# Patient Record
(demographics unavailable — no encounter records)

---

## 2018-01-23 NOTE — ED PDOC
HPI: Abdomen


Time Seen by Provider: 18 12:30


Chief Complaint (Nursing): Female Genitourinary


Chief Complaint (Provider): abdominal pain


History Per: Patient


History/Exam Limitations: no limitations


Onset/Duration Of Symptoms: Days (x1 week)


Current Symptoms Are (Timing): Still Present


Additional Complaint(s): 





27 year old female, approximately 14 weeks pregnant, who presents to the 

emergency department with  for an evaluation of lower abdominal pain 

associated with low back pain when urinating ongoing for 1 week. Denied any 

fever, chills, nausea, vomiting, difficulty urinating, bloody urine, 

incontinence, constipation, diarrhea or vaginal bleeding. Patient reported 7/10 

pain. 





LMP: 10/12/17


PMD: none provided 





Past Medical History


Reviewed: Historical Data, Nursing Documentation, Vital Signs


Vital Signs: 


 Last Vital Signs











Temp  99.2 F   18 10:55


 


Pulse  81   18 10:55


 


Resp  17   18 10:55


 


BP  118/69   18 10:55


 


Pulse Ox  99   18 14:47














- Medical History


PMH: No Chronic Diseases





- Surgical History


Surgical History: 


   Denies: No Surg Hx





- Family History


Family History: States: Unknown Family Hx





- Social History


Current smoker - smoking cessation education provided: No


Ex-Smoker (has not smoked in the last 12 months): No


Alcohol: None


Drugs: Denies





- Home Medications


Home Medications: 


 Ambulatory Orders











 Medication  Instructions  Recorded


 


Nitrofurantoin Macrocrystals 100 mg PO BID #14 cap 18





[Macrobid]  


 


Prenatal Multivit/Folic Acid/I 1 tab PO DAILY #30 tab 18





[Prenatal]  














- Allergies


Allergies/Adverse Reactions: 


 Allergies











Allergy/AdvReac Type Severity Reaction Status Date / Time


 


No Known Allergies Allergy   Verified 18 11:43














Review of Systems


ROS Statement: Except As Marked, All Systems Reviewed And Found Negative


Constitutional: Negative for: Fever, Chills


Gastrointestinal: Positive for: Abdominal Pain (lower).  Negative for: Nausea, 

Vomiting, Diarrhea, Constipation


Genitourinary Female: Negative for: Dysuria, Incontinence, Hematuria, Vaginal 

Bleeding


Musculoskeletal: Positive for: Back Pain (lower)





Physical Exam





- Reviewed


Nursing Documentation Reviewed: Yes


Vital Signs Reviewed: Yes





- Physical Exam


Appears: Positive for: Well, Non-toxic, No Acute Distress


Skin: Positive for: Normal Color, Warm, Dry


Neck: Positive for: Normal, Supple


Cardiovascular/Chest: Positive for: Regular Rate, Rhythm, Chest Non Tender


Respiratory: Positive for: Normal Breath Sounds.  Negative for: Decreased 

Breath Sounds, Rhonchi, Wheezing, Respiratory Distress


Gastrointestinal/Abdominal: Positive for: Soft, Tenderness (suprapubic mildly).

  Negative for: Normal Exam, Guarding, Rebound


Back: Positive for: Normal Inspection.  Negative for: L CVA Tenderness, R CVA 

Tenderness, Vertebral Tenderness


Extremity: Positive for: Normal ROM (lower).  Negative for: Tenderness, Pedal 

Edema (bilateral), Calf Tenderness (bilateral), Swelling


Neurologic/Psych: Positive for: Alert (x3), CNs II-XII (intact), Oriented





- Laboratory Results


Result Diagrams: 


 18 13:09





 18 13:09





- ECG


O2 Sat by Pulse Oximetry: 99 (RA)


Pulse Ox Interpretation: Normal





Medical Decision Making


Medical Decision Making: 





Initial Impression: Suprapubic tenderness





Initial Plan:


* BMP


* BETA-HCG


* Urine pregnancy


* CBC


* US transvaginal


________________________________________________________________________________

___





Uhcg (+)


UA (+) trace leuks, urine cx sent. 





Labs reviewed and are wnl. Patient sent to US.





Time: 


--US transvaginal


FINDINGS:


Transabdominal technique was utilized in evaluation of pregnancy. 


UTERUS:


A single viable intrauterine gestation is appreciated within anterior/superior 

developing placenta and no definitive pattern of placental abruption or previa 

at this time. Fetal pole is identified with cardiac rate of 159 beats per 

minute. No suspicious myometrial lesion is identified. 


The following fetal biometry was obtained: 


BPD 2.3 cm corresponds to 13 weeks 6 days. 


HC 8.64 cm corresponds to 13 weeks 5 days. 


AC 6.9 cm corresponds to 13 weeks 3 3 over FL 1.0 cm corresponds to 13 weeks 0 

days. 


Average ultrasonic age is 13 weeks 4 days which is generally concordant with 

menstrual dates. Estimated date of delivery is 2018. 


Uterus measures 14.2 x 7.6 x 9.9 cm. Expectantly enlarged but otherwise 

unremarkable. 


CERVIX:


Long and closed. No cervical abnormality seen. Cervical length is elongated 

measuring approximately 4.5 cm.


RIGHT OVARY:


Not identified.  No suspicious adnexal mass or fluid collection appreciable.


LEFT OVARY:


Not identified.  No suspicious adnexal mass or fluid collection appreciable.


FREE FLUID:


None.


OTHER FINDINGS:


None. 





IMPRESSION:


A single viable intrauterine gestation is identified within average ultrasonic 

age of 13 weeks 4 days which is generally in agreement with menstrual dates of 

14 weeks 5 days as discussed above. Fetal biometry as discussed above.  Follow-

up ultrasound can be utilized for fetal anatomical survey or as otherwise may 

be clinically required. No definite decidual hemorrhage appreciable.








US results d/w the patient in great detail. On reevaluation, patient is resting 

comfortably in bed in no acute distress. Patient reports no nausea, abdominal 

pain or vaginal bleeding at this time. On exam, abdomen remains soft with no 

tenderness, no guarding, no rebound, no CVA tenderness.


Based on history, exam and diagnostic results plan will be for outpatient follow

-up at the clinic. Diagnosis of UTI and pregnancy discussed with the patient in 

great detail. Advised to start taking prenatal vitamins.





Follow up with the clinic in 1-2 days without fail. Advised to take medication 

as prescribed. Return to the emergency room at any time for any new or 

worsening symptoms. Patient states she fully agrees with and understands 

discharge instructions. States that she agrees with the plan and disposition. 

Verbalized and repeated discharge instructions and plan. I have given the 

patient opportunity to ask any additional questions. 





--------------------------------------------------------------------------------

-----------------


Scribe Attestation:


Documented by Bella Mcghee, acting as a scribe for Mami Oconnell PA-C.





Provider Scribe Attestation:


All medical record entries made by the Scribe were at my direction and 

personally dictated by me. I have reviewed the chart and agree that the record 

accurately reflects my personal performance of the history, physical exam, 

medical decision making, and the department course for this patient. I have 

also personally directed, reviewed, and agree with the discharge instructions 

and disposition.





Disposition





- Clinical Impression


Clinical Impression: 


 Urinary tract infection, Pregnancy, Abdominal pain








- Patient ED Disposition


Is Patient to be Admitted: No


Counseled Patient/Family Regarding: Studies Performed, Diagnosis, Need For 

Followup, Rx Given





- Disposition


Referrals: 


[facility] [Outside]


Disposition Time: 14:45


Condition: STABLE


Additional Instructions: 


Thank you for letting us take care of you today. You were treated for UTI, 

abdominal pain, pregnant. The emergency medical care you received today was 

directed at your acute symptoms. If you were prescribed any medication, please 

fill it and take as directed. It may take several days for your symptoms to 

resolve. Return to the Emergency Department if your symptoms worsen, do not 

improve, or if you have any other problems.





Please contact the clinic you have been referred to that are listed on the 

Patient Visit Information form that is included in your discharge packet. Bring 

any paperwork you were given at discharge with you along with any medications 

you are taking to your follow up visit. Our treatment cannot replace ongoing 

medical care by a primary care provider (PCP) outside of the emergency 

department.





Thank you for allowing the [email or web address] team to be part of your care today.








Prescriptions: 


Nitrofurantoin Macrocrystals [Macrobid] 100 mg PO BID #14 cap


Prenatal Multivit/Folic Acid/I [Prenatal] 1 tab PO DAILY #30 tab


Instructions:  Urinary Tract Infection in Women (ED), Pregnancy at 11 to 14 

Weeks (ED)


Forms:  [facility] (English), [facility] ED School/Work Excuse


Print Language: ENGLISH

## 2018-01-23 NOTE — US
PROCEDURE:  OB Pelvic Ultrasound



HISTORY:

pregnant, abd pain ; last menstrual period was reported 10/12/2017 

corresponding to an estimated gestational age of 14 weeks 5 days. 



COMPARISON:

None available.



FINDINGS:

Transabdominal technique was utilized in evaluation of pregnancy. 



UTERUS:

A single viable intrauterine gestation is appreciated within 

anterior/superior developing placenta and no definitive pattern of 

placental abruption or previa at this time. Fetal pole is identified 

with cardiac rate of 159 beats per minute. No suspicious myometrial 

lesion is identified. 







The following fetal biometry was obtained: 



BPD 2.3 cm corresponds to 13 weeks 6 days. 



HC 8.64 cm corresponds to 13 weeks 5 days. 



AC 6.9 cm corresponds to 13 weeks 3 3 over FL 1.0 cm corresponds to 

13 weeks 0 days. 



Average ultrasonic age is 13 weeks 4 days which is generally 

concordant with menstrual dates. Estimated date of delivery is 

07/27/2018. 



Uterus measures 14.2 x 7.6 x 9.9 cm. Expectantly enlarged but 

otherwise unremarkable. 



CERVIX:

Long and closed. No cervical abnormality seen. Cervical length is 

elongated measuring approximately 4.5 cm.



RIGHT OVARY:

Not identified.  No suspicious adnexal mass or fluid collection 

appreciable.



LEFT OVARY:

Not identified.  No suspicious adnexal mass or fluid collection 

appreciable.



FREE FLUID:

None.



OTHER FINDINGS:

None. 



IMPRESSION:

A single viable intrauterine gestation is identified within average 

ultrasonic age of 13 weeks 4 days which is generally in agreement 

with menstrual dates of 14 weeks 5 days as discussed above. Fetal 

biometry as discussed above.  Follow-up ultrasound can be utilized 

for fetal anatomical survey or as otherwise may be clinically 

required. No definite decidual hemorrhage appreciable.